# Patient Record
Sex: FEMALE | Race: WHITE | Employment: UNEMPLOYED | URBAN - METROPOLITAN AREA
[De-identification: names, ages, dates, MRNs, and addresses within clinical notes are randomized per-mention and may not be internally consistent; named-entity substitution may affect disease eponyms.]

---
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
## 2022-08-17 NOTE — PROGRESS NOTES
[id number] Received report from off going RN. Patient alert and oriented. Denies pain or discomfort. Family at bedside. Callbell within reach. 1300 Resting in bed with family present. Encouraged to drink liquids. 1818 Patient with complaints increased back pain and states she feels full when she drinks. Patient mother concerned and wants to know if patient can have continuous IV fluid. Bladder scan done with results 129ml. Metsa 36 Paged Ben Michael NP.    7835 Attempted to call Dr. Sandra Vidal answering service without success. Paged Dr. Damián Brady. Waiting for return patient. 1930 Bedside and Verbal shift change report given to aida henderson (oncoming nurse) by Justo Prather (offgoing nurse). Report included the following information SBAR, Kardex and MAR. Patient up to bathroom. Voided 300ml of francisco urine with small amount of sediment. No